# Patient Record
Sex: MALE | URBAN - METROPOLITAN AREA
[De-identification: names, ages, dates, MRNs, and addresses within clinical notes are randomized per-mention and may not be internally consistent; named-entity substitution may affect disease eponyms.]

---

## 2017-03-01 NOTE — PATIENT DISCUSSION
CME OD: EDEMA RESOLVED. PT TO RESTART DROPS IMMEDIATELY AND CALL  IF SHE NOTICES A DECREASE IN VISION.

## 2017-04-19 NOTE — PATIENT DISCUSSION
CME OD RECURRENT - PRESCRIBE PRED FORTE QID OD. PT TO FOLLOW UP AS DIRECTED. IF PT DOESN'T NOTICE IMPROVEMENT IN A COUPLE WEEKS, SHE WILL CALL AND WE CAN SEND IN BROMFENAC.

## 2017-04-19 NOTE — PATIENT DISCUSSION
New Prescription: prednisolone acetate (prednisolone acetate): drops,suspension: 1% 1 drop four times a day into right eye 04-

## 2017-07-03 NOTE — PATIENT DISCUSSION
Continue: prednisolone acetate (prednisolone acetate): drops,suspension: 1% 1 drop four times a day into right eye 04-

## 2017-11-29 NOTE — PATIENT DISCUSSION
Continue: prednisolone acetate (prednisolone acetate): drops,suspension: 1% 1 drop as needed into right eye 04-

## 2018-03-19 NOTE — PATIENT DISCUSSION
New Prescription: Pred Forte (prednisolone acetate): drops,suspension: 1% 1 drop four times a day into right eye 03-

## 2018-04-11 NOTE — PATIENT DISCUSSION
Continue: Pred Forte (prednisolone acetate): drops,suspension: 1% 1 drop four times a day into right eye 03-

## 2018-04-11 NOTE — PATIENT DISCUSSION
OLD RD OD:  RETINA IS FLAT W/ NO CME TODAY. TAPER PRED FORTE TID FOR A WEEK, THEN 2X A DAY FOR A WEEK, THEN ONCE A DAY FOR A WEEK, THEN STOP.

## 2019-02-18 ENCOUNTER — IMPORTED ENCOUNTER (OUTPATIENT)
Dept: URBAN - METROPOLITAN AREA CLINIC 9 | Facility: CLINIC | Age: 44
End: 2019-02-18

## 2019-04-23 NOTE — PATIENT DISCUSSION
Stopped Today: Pred Forte (prednisolone acetate): drops,suspension: 1% 1 drop four times a day into right eye 03-

## 2021-03-24 NOTE — PATIENT DISCUSSION
DERMATOCHALASIS (UPPER LIDS), OU:  VISUALLY SIGNIFICANT TO PATIENT. PATIENT DOES NOT WANT TO SCHEDULE NOW.   WILL CALL WHEN SHE IS READY TO SCHEDULE UPPER LID BLEPHAROPLASTY, OU.

## 2021-10-16 ASSESSMENT — TONOMETRY
OS_IOP_MMHG: 12
OD_IOP_MMHG: 16

## 2021-10-16 ASSESSMENT — KERATOMETRY
OD_K2POWER_DIOPTERS: 42.75
OS_AXISANGLE_DEGREES: 174
OD_AXISANGLE_DEGREES: 167
OS_K2POWER_DIOPTERS: 7.75
OS_K1POWER_DIOPTERS: 42.5
OD_AXISANGLE2_DEGREES: 77
OS_AXISANGLE2_DEGREES: 84
OD_K1POWER_DIOPTERS: 42.25

## 2021-10-16 ASSESSMENT — VISUAL ACUITY
OD_CC: 20/20 - SN
OS_SC: 20/30 + SN
OS_CC: 20/20 SN
OD_SC: 20/40 - SN

## 2022-06-30 RX ORDER — AMOXICILLIN AND CLAVULANATE POTASSIUM 875; 125 MG/1; MG/1
TABLET, FILM COATED ORAL
COMMUNITY

## 2022-06-30 RX ORDER — GABAPENTIN 600 MG/1
TABLET ORAL
COMMUNITY

## 2022-06-30 RX ORDER — OXYCODONE HYDROCHLORIDE 5 MG/1
TABLET ORAL
COMMUNITY
Start: 2013-02-04

## 2022-06-30 RX ORDER — CIPROFLOXACIN 500 MG/1
TABLET, FILM COATED ORAL
COMMUNITY

## 2022-06-30 RX ORDER — OXYCODONE HYDROCHLORIDE AND ACETAMINOPHEN 5; 325 MG/1; MG/1
TABLET ORAL
COMMUNITY
Start: 2017-02-22